# Patient Record
Sex: MALE | URBAN - METROPOLITAN AREA
[De-identification: names, ages, dates, MRNs, and addresses within clinical notes are randomized per-mention and may not be internally consistent; named-entity substitution may affect disease eponyms.]

---

## 2020-11-17 ENCOUNTER — OFFICE VISIT (OUTPATIENT)
Dept: URBAN - METROPOLITAN AREA CLINIC 128 | Facility: CLINIC | Age: 55
End: 2020-11-17
Payer: COMMERCIAL

## 2020-11-17 ENCOUNTER — LAB OUTSIDE AN ENCOUNTER (OUTPATIENT)
Dept: URBAN - METROPOLITAN AREA CLINIC 128 | Facility: CLINIC | Age: 55
End: 2020-11-17

## 2020-11-17 ENCOUNTER — WEB ENCOUNTER (OUTPATIENT)
Dept: URBAN - METROPOLITAN AREA CLINIC 128 | Facility: CLINIC | Age: 55
End: 2020-11-17

## 2020-11-17 DIAGNOSIS — K57.92 DIVERTICULITIS: ICD-10-CM

## 2020-11-17 DIAGNOSIS — R10.9 ABDOMINAL PAIN: ICD-10-CM

## 2020-11-17 DIAGNOSIS — K76.0 HEPATIC STEATOSIS: ICD-10-CM

## 2020-11-17 DIAGNOSIS — D72.829 LEUKOCYTOSIS: ICD-10-CM

## 2020-11-17 PROBLEM — 307496006 DIVERTICULITIS: Status: ACTIVE | Noted: 2020-11-17

## 2020-11-17 PROBLEM — 111359004 DIVERTICULITIS OF COLON: Status: ACTIVE | Noted: 2020-11-17

## 2020-11-17 PROBLEM — 111583006 LEUKOCYTOSIS: Status: ACTIVE | Noted: 2020-11-17

## 2020-11-17 PROBLEM — 197321007 STEATOSIS OF LIVER: Status: ACTIVE | Noted: 2020-11-17

## 2020-11-17 PROCEDURE — G8420 CALC BMI NORM PARAMETERS: HCPCS | Performed by: PHYSICIAN ASSISTANT

## 2020-11-17 PROCEDURE — 3017F COLORECTAL CA SCREEN DOC REV: CPT | Performed by: PHYSICIAN ASSISTANT

## 2020-11-17 PROCEDURE — G8427 DOCREV CUR MEDS BY ELIG CLIN: HCPCS | Performed by: PHYSICIAN ASSISTANT

## 2020-11-17 PROCEDURE — 4004F PT TOBACCO SCREEN RCVD TLK: CPT | Performed by: PHYSICIAN ASSISTANT

## 2020-11-17 PROCEDURE — G8483 FLU IMM NO ADMIN DOC REA: HCPCS | Performed by: PHYSICIAN ASSISTANT

## 2020-11-17 PROCEDURE — 99204 OFFICE O/P NEW MOD 45 MIN: CPT | Performed by: PHYSICIAN ASSISTANT

## 2020-11-17 RX ORDER — POLYETHYLENE GLYOCOL 3350, SODIUM CHLORIDE, SODIUM BICARBONATE AND POTASSIUM CHLORIDE 420; 11.2; 5.72; 1.48 G/4L; G/4L; G/4L; G/4L
SPLIT DOSE REGIMEN: TAKE ONE HALF OF PREP PO AT 5PM AND TAKE THE OTHER HALF OF PREP PO AT 10PM POWDER, FOR SOLUTION NASOGASTRIC; ORAL
Qty: 1 BOTTLE | Refills: 0 | OUTPATIENT
Start: 2020-11-17 | End: 2020-11-18

## 2020-11-17 RX ORDER — TAMSULOSIN HYDROCHLORIDE 0.4 MG/1
1 CAPSULE CAPSULE ORAL ONCE A DAY
Status: ACTIVE | COMMUNITY

## 2020-11-17 RX ORDER — OLMESARTAN MEDOXOMIL 40 MG/1
1 TABLET TABLET ORAL ONCE A DAY
Status: ACTIVE | COMMUNITY

## 2020-11-17 NOTE — HPI-OTHER HISTORIES
The patient is here for a hospital follow up. He went [place] [facility] [place] [facility] [place] for LLQ abdominal pain and nausea. His WBC was 14.4, BUN 29, LFTs normal. His abd/pelvic CT scan revealed extensive colonic diverticulosis with acute diverticulitis of the distal descending colon and proximal sigmoid colon with no focal abscess or perforation and hepatic steatosis is noted. He was discharged with augmentin and finished it. He now feels much better and is abdominal pain is gone now. For the past year he has been treated by a urologist for prostatitis and he is curious if instead of prostatitis maybe he was having flares of diverticulitis all along. This is the first time he has been diagnosed with it officially. His last colonoscopy was 2015 and states it was normal with no polyps noted. He takes alevel daily for joint paina nd was instructed to stop this.

## 2020-11-19 LAB
A/G RATIO: 2
ALBUMIN: 4.9
ALKALINE PHOSPHATASE: 57
ALT (SGPT): 36
AST (SGOT): 26
BASO (ABSOLUTE): 0.1
BASOS: 1
BILIRUBIN, TOTAL: 0.2
BUN/CREATININE RATIO: 20
BUN: 25
CALCIUM: 9.5
CARBON DIOXIDE, TOTAL: 25
CHLORIDE: 101
CREATININE: 1.27
EGFR IF AFRICN AM: 73
EGFR IF NONAFRICN AM: 63
EOS (ABSOLUTE): 0.3
EOS: 3
GLOBULIN, TOTAL: 2.5
GLUCOSE: 111
HEMATOCRIT: 43.8
HEMATOLOGY COMMENTS:: (no result)
HEMOGLOBIN: 14.7
IMMATURE CELLS: (no result)
IMMATURE GRANS (ABS): 0
IMMATURE GRANULOCYTES: 0
LYMPHS (ABSOLUTE): 2.2
LYMPHS: 26
MCH: 31.1
MCHC: 33.6
MCV: 93
MONOCYTES(ABSOLUTE): 1
MONOCYTES: 11
NEUTROPHILS (ABSOLUTE): 5
NEUTROPHILS: 59
NRBC: (no result)
PLATELETS: 246
POTASSIUM: 4.4
PROTEIN, TOTAL: 7.4
RBC: 4.72
RDW: 12.2
SODIUM: 141
WBC: 8.5

## 2020-12-18 ENCOUNTER — TELEPHONE ENCOUNTER (OUTPATIENT)
Dept: URBAN - METROPOLITAN AREA CLINIC 128 | Facility: CLINIC | Age: 55
End: 2020-12-18

## 2020-12-18 RX ORDER — CIPROFLOXACIN 500 MG/1
1 TABLET TABLET, FILM COATED ORAL
Qty: 20 TABLET | Refills: 0 | OUTPATIENT
Start: 2020-12-22

## 2020-12-18 RX ORDER — METRONIDAZOLE 500 MG/1
1 TABLET TABLET, FILM COATED ORAL THREE TIMES A DAY
Qty: 30 TABLET | Refills: 0 | OUTPATIENT
Start: 2020-12-22

## 2021-01-08 ENCOUNTER — OFFICE VISIT (OUTPATIENT)
Dept: URBAN - METROPOLITAN AREA SURGERY CENTER 31 | Facility: SURGERY CENTER | Age: 56
End: 2021-01-08

## 2021-01-15 ENCOUNTER — OFFICE VISIT (OUTPATIENT)
Dept: URBAN - METROPOLITAN AREA SURGERY CENTER 31 | Facility: SURGERY CENTER | Age: 56
End: 2021-01-15
Payer: COMMERCIAL

## 2021-01-15 ENCOUNTER — CLAIMS CREATED FROM THE CLAIM WINDOW (OUTPATIENT)
Dept: URBAN - METROPOLITAN AREA CLINIC 4 | Facility: CLINIC | Age: 56
End: 2021-01-15
Payer: COMMERCIAL

## 2021-01-15 DIAGNOSIS — D12.5 ADENOMA OF SIGMOID COLON: ICD-10-CM

## 2021-01-15 DIAGNOSIS — Z12.11 COLON CANCER SCREENING: ICD-10-CM

## 2021-01-15 DIAGNOSIS — D12.5 BENIGN NEOPLASM OF SIGMOID COLON: ICD-10-CM

## 2021-01-15 PROCEDURE — G8907 PT DOC NO EVENTS ON DISCHARG: HCPCS | Performed by: INTERNAL MEDICINE

## 2021-01-15 PROCEDURE — 88305 TISSUE EXAM BY PATHOLOGIST: CPT | Performed by: PATHOLOGY

## 2021-01-15 PROCEDURE — 45385 COLONOSCOPY W/LESION REMOVAL: CPT | Performed by: INTERNAL MEDICINE

## 2021-06-22 ENCOUNTER — TELEPHONE ENCOUNTER (OUTPATIENT)
Dept: URBAN - METROPOLITAN AREA CLINIC 128 | Facility: CLINIC | Age: 56
End: 2021-06-22

## 2021-06-24 ENCOUNTER — TELEPHONE ENCOUNTER (OUTPATIENT)
Dept: URBAN - METROPOLITAN AREA CLINIC 127 | Facility: CLINIC | Age: 56
End: 2021-06-24

## 2021-07-07 ENCOUNTER — OFFICE VISIT (OUTPATIENT)
Dept: URBAN - METROPOLITAN AREA CLINIC 128 | Facility: CLINIC | Age: 56
End: 2021-07-07
Payer: COMMERCIAL

## 2021-07-07 ENCOUNTER — WEB ENCOUNTER (OUTPATIENT)
Dept: URBAN - METROPOLITAN AREA CLINIC 128 | Facility: CLINIC | Age: 56
End: 2021-07-07

## 2021-07-07 VITALS
HEART RATE: 63 BPM | WEIGHT: 268 LBS | TEMPERATURE: 97.3 F | BODY MASS INDEX: 36.3 KG/M2 | DIASTOLIC BLOOD PRESSURE: 79 MMHG | HEIGHT: 72 IN | SYSTOLIC BLOOD PRESSURE: 145 MMHG

## 2021-07-07 DIAGNOSIS — R10.32 LEFT LOWER QUADRANT ABDOMINAL PAIN: ICD-10-CM

## 2021-07-07 DIAGNOSIS — R19.7 DIARRHEA, UNSPECIFIED TYPE: ICD-10-CM

## 2021-07-07 DIAGNOSIS — Z87.19 HISTORY OF DIVERTICULITIS: ICD-10-CM

## 2021-07-07 DIAGNOSIS — K21.9 GASTROESOPHAGEAL REFLUX DISEASE WITHOUT ESOPHAGITIS: ICD-10-CM

## 2021-07-07 DIAGNOSIS — R14.0 BLOATING: ICD-10-CM

## 2021-07-07 PROCEDURE — 99213 OFFICE O/P EST LOW 20 MIN: CPT | Performed by: INTERNAL MEDICINE

## 2021-07-07 RX ORDER — METRONIDAZOLE 500 MG/1
1 TABLET TABLET, FILM COATED ORAL THREE TIMES A DAY
Qty: 30 TABLET | Refills: 0 | Status: DISCONTINUED | COMMUNITY
Start: 2020-12-22

## 2021-07-07 RX ORDER — CHOLESTYRAMINE 4 G/9G
1 PACKET MIXED WITH WATER OR NON-CARBONATED DRINK POWDER, FOR SUSPENSION ORAL TWICE A DAY
Qty: 60 | Refills: 2 | OUTPATIENT
Start: 2021-07-07

## 2021-07-07 RX ORDER — OLMESARTAN MEDOXOMIL 40 MG/1
1 TABLET TABLET ORAL ONCE A DAY
Status: ACTIVE | COMMUNITY

## 2021-07-07 RX ORDER — PANTOPRAZOLE SODIUM 20 MG/1
1 TABLET TABLET, DELAYED RELEASE ORAL ONCE A DAY
Qty: 30 | Refills: 5 | OUTPATIENT
Start: 2021-07-07

## 2021-07-07 RX ORDER — CIPROFLOXACIN 500 MG/1
1 TABLET TABLET, FILM COATED ORAL
Qty: 20 TABLET | Refills: 0 | Status: DISCONTINUED | COMMUNITY
Start: 2020-12-22

## 2021-07-07 RX ORDER — TAMSULOSIN HYDROCHLORIDE 0.4 MG/1
1 CAPSULE CAPSULE ORAL ONCE A DAY
Status: DISCONTINUED | COMMUNITY

## 2021-07-07 NOTE — HPI-OTHER HISTORIES
The patient elicits having had LLQ abdominal pain and thought he was in a diverticulitis flare so he freeman dour office and Dr. Hoff called in two antibiotics for him to take. After finishing them he feels fine from his LLQ abdominal pain now. However,  he now has little energy, gas, early satiety, acid reflux, nausea, abdominal bloating, and some diarrhea. His reflux is refractory to tagamet.  He usually has 1-2 Bms a day and sometimes he will have up to 7 BMs a day. His 2015 colonoscopy was normal with no polyps noted. His 1/2021 colonoscopy revealed a tubular adenoma and he is to repeat it in 2 years.

## 2021-07-07 NOTE — PHYSICAL EXAM GASTROINTESTINAL
Abdomen , soft, nontender, nondistended , no guarding or rigidity , no masses palpable , normal bowel sounds, negatve Gutierrez's sign, negative psoas and obturators signs, negative CVA tenderness bilaterally Liver and Spleen , no hepatosplenomegaly Rectal deferred

## 2021-07-12 PROBLEM — 698065002 ACID REFLUX: Status: ACTIVE | Noted: 2021-07-12

## 2021-07-29 LAB
A/G RATIO: 2
ALBUMIN: 5.2
ALKALINE PHOSPHATASE: 60
ALT (SGPT): 56
AST (SGOT): 31
BASO (ABSOLUTE): 0.1
BASOS: 1
BILIRUBIN, TOTAL: 0.5
BUN/CREATININE RATIO: 19
BUN: 24
C DIFFICILE TOXINS A+B, EIA: NEGATIVE
CALCIUM: 10.1
CAMPYLOBACTER CULTURE: (no result)
CARBON DIOXIDE, TOTAL: 30
CHLORIDE: 101
CREATININE: 1.24
DEAMIDATED GLIADIN ABS, IGA: 3
DEAMIDATED GLIADIN ABS, IGG: 2
E COLI SHIGA TOXIN EIA: NEGATIVE
EGFR IF AFRICN AM: 75
EGFR IF NONAFRICN AM: 65
ENDOMYSIAL ANTIBODY IGA: NEGATIVE
EOS (ABSOLUTE): 0.1
EOS: 1
GLOBULIN, TOTAL: 2.6
GLUCOSE: 94
HEMATOCRIT: 49.6
HEMATOLOGY COMMENTS:: (no result)
HEMOGLOBIN: 16.9
IMMATURE CELLS: (no result)
IMMATURE GRANS (ABS): 0
IMMATURE GRANULOCYTES: 0
IMMUNOGLOBULIN A, QN, SERUM: 122
LYMPHS (ABSOLUTE): 2.1
LYMPHS: 28
Lab: (no result)
MCH: 31.9
MCHC: 34.1
MCV: 94
MONOCYTES(ABSOLUTE): 0.9
MONOCYTES: 13
NEUTROPHILS (ABSOLUTE): 4.2
NEUTROPHILS: 57
NRBC: (no result)
OVA + PARASITE EXAM: (no result)
PLATELETS: 231
POTASSIUM: 4.2
PROTEIN, TOTAL: 7.8
RBC: 5.29
RDW: 12.4
SALMONELLA/SHIGELLA SCREEN: (no result)
SODIUM: 142
T-TRANSGLUTAMINASE (TTG) IGA: <2
T-TRANSGLUTAMINASE (TTG) IGG: 4
TSH: 1.25
WBC: 7.4

## 2021-07-30 ENCOUNTER — OFFICE VISIT (OUTPATIENT)
Dept: URBAN - METROPOLITAN AREA SURGERY CENTER 31 | Facility: SURGERY CENTER | Age: 56
End: 2021-07-30
Payer: COMMERCIAL

## 2021-07-30 ENCOUNTER — CLAIMS CREATED FROM THE CLAIM WINDOW (OUTPATIENT)
Dept: URBAN - METROPOLITAN AREA CLINIC 4 | Facility: CLINIC | Age: 56
End: 2021-07-30
Payer: COMMERCIAL

## 2021-07-30 DIAGNOSIS — K21.9 ACID REFLUX: ICD-10-CM

## 2021-07-30 DIAGNOSIS — K31.89 GASTRIC FOVEOLAR HYPERPLASIA: ICD-10-CM

## 2021-07-30 DIAGNOSIS — R19.7 ACUTE DIARRHEA: ICD-10-CM

## 2021-07-30 DIAGNOSIS — K31.89 ACQUIRED DEFORMITY OF DUODENUM: ICD-10-CM

## 2021-07-30 DIAGNOSIS — R10.13 ABDOMINAL DISCOMFORT, EPIGASTRIC: ICD-10-CM

## 2021-07-30 PROCEDURE — 88312 SPECIAL STAINS GROUP 1: CPT | Performed by: PATHOLOGY

## 2021-07-30 PROCEDURE — 43239 EGD BIOPSY SINGLE/MULTIPLE: CPT | Performed by: INTERNAL MEDICINE

## 2021-07-30 PROCEDURE — 88305 TISSUE EXAM BY PATHOLOGIST: CPT | Performed by: PATHOLOGY

## 2022-04-04 ENCOUNTER — TELEPHONE ENCOUNTER (OUTPATIENT)
Dept: URBAN - METROPOLITAN AREA CLINIC 19 | Facility: CLINIC | Age: 57
End: 2022-04-04

## 2022-04-04 RX ORDER — HYOSCYAMINE SULFATE 0.12 MG/1
1 TABLET UNDER THE TONGUE AND ALLOW TO DISSOLVE  AS NEEDED TABLET, ORALLY DISINTEGRATING ORAL THREE TIMES A DAY
Qty: 30 | Refills: 0 | OUTPATIENT
Start: 2022-04-05 | End: 2022-05-05

## 2022-04-05 ENCOUNTER — WEB ENCOUNTER (OUTPATIENT)
Dept: URBAN - METROPOLITAN AREA CLINIC 128 | Facility: CLINIC | Age: 57
End: 2022-04-05

## 2022-04-05 RX ORDER — PANTOPRAZOLE SODIUM 20 MG/1
1 TABLET TABLET, DELAYED RELEASE ORAL ONCE A DAY
Qty: 30 | Refills: 5
Start: 2021-07-07

## 2022-04-06 ENCOUNTER — OFFICE VISIT (OUTPATIENT)
Dept: URBAN - METROPOLITAN AREA CLINIC 128 | Facility: CLINIC | Age: 57
End: 2022-04-06
Payer: COMMERCIAL

## 2022-04-06 DIAGNOSIS — R14.0 BLOATING: ICD-10-CM

## 2022-04-06 DIAGNOSIS — R10.9 ABDOMINAL PAIN, UNSPECIFIED ABDOMINAL LOCATION: ICD-10-CM

## 2022-04-06 DIAGNOSIS — R19.7 DIARRHEA, UNSPECIFIED TYPE: ICD-10-CM

## 2022-04-06 DIAGNOSIS — K21.9 GASTROESOPHAGEAL REFLUX DISEASE WITHOUT ESOPHAGITIS: ICD-10-CM

## 2022-04-06 DIAGNOSIS — K42.9 UMBILICAL HERNIA WITHOUT OBSTRUCTION AND WITHOUT GANGRENE: ICD-10-CM

## 2022-04-06 DIAGNOSIS — Z86.010 PERSONAL HISTORY OF COLONIC POLYPS: ICD-10-CM

## 2022-04-06 DIAGNOSIS — Z87.19 HISTORY OF DIVERTICULITIS: ICD-10-CM

## 2022-04-06 PROCEDURE — 99214 OFFICE O/P EST MOD 30 MIN: CPT | Performed by: PHYSICIAN ASSISTANT

## 2022-04-06 RX ORDER — HYOSCYAMINE SULFATE 0.12 MG/1
1 TABLET UNDER THE TONGUE AND ALLOW TO DISSOLVE  AS NEEDED TABLET, ORALLY DISINTEGRATING ORAL THREE TIMES A DAY
Qty: 30 | Refills: 0 | Status: ACTIVE | COMMUNITY
Start: 2022-04-05 | End: 2022-05-05

## 2022-04-06 RX ORDER — CHOLESTYRAMINE 4 G/9G
1 PACKET MIXED WITH WATER OR NON-CARBONATED DRINK POWDER, FOR SUSPENSION ORAL TWICE A DAY
Qty: 60 | Refills: 2 | Status: ON HOLD | COMMUNITY
Start: 2021-07-07

## 2022-04-06 RX ORDER — METRONIDAZOLE 375 MG/1
2 CAPSULES CAPSULE ORAL
Qty: 30 | OUTPATIENT
Start: 2022-04-06 | End: 2022-04-11

## 2022-04-06 RX ORDER — CIPROFLOXACIN 500 MG/1
1 TABLET TABLET, FILM COATED ORAL
Qty: 20 TABLET | Refills: 0 | OUTPATIENT
Start: 2022-04-06 | End: 2022-04-16

## 2022-04-06 RX ORDER — OLMESARTAN MEDOXOMIL 40 MG/1
1 TABLET TABLET ORAL ONCE A DAY
Status: ACTIVE | COMMUNITY

## 2022-04-06 RX ORDER — PANTOPRAZOLE SODIUM 20 MG/1
1 TABLET TABLET, DELAYED RELEASE ORAL ONCE A DAY
Qty: 30 | Refills: 5 | OUTPATIENT

## 2022-04-06 RX ORDER — CHOLESTYRAMINE 4 G/9G
1 PACKET MIXED WITH WATER OR NON-CARBONATED DRINK POWDER, FOR SUSPENSION ORAL TWICE A DAY
Qty: 60 | Refills: 2 | OUTPATIENT
Start: 2022-04-06

## 2022-04-06 RX ORDER — RIFAXIMIN 550 MG/1
1 TABLET TABLET ORAL THREE TIMES A DAY
Qty: 42 TABLET | Refills: 0 | OUTPATIENT
Start: 2022-04-06 | End: 2022-04-20

## 2022-04-06 RX ORDER — PANTOPRAZOLE SODIUM 20 MG/1
1 TABLET TABLET, DELAYED RELEASE ORAL ONCE A DAY
Qty: 30 | Refills: 5 | Status: ACTIVE | COMMUNITY
Start: 2021-07-07

## 2022-04-06 NOTE — PHYSICAL EXAM GASTROINTESTINAL
Abdomen , soft, tenderness to palpation was noted in the LUQ and LLQ, nondistended , no guarding or rigidity , no masses palpable , normal bowel sounds, a diastasis recti was noted, an uncarcerated umbilical hernia was noted, negative mUrpny's sign. negative psoa and obturators signs, negative CVA tenderness bilaterally Liver and Spleen , no hepatosplenomegaly Rectal deferred

## 2022-04-06 NOTE — HPI-OTHER HISTORIES
The patient is here today for abdominal pain. He had COVID-19 in 01/2022. He has known diverticulitis and has been taking fiber to avoid flares. Currnetlhe elicits having more bloating issues. He has noticed at his umbilicus some bulging. He elicits generalized abdominal cramps. His 1/2021 colonoscopy revealed a tubular adenoma and he is to repeat it in 2 years. The patient had a negative urinalysis last week. He state shis prostate was normal 1 year ago. EGD 2021 revealed gastropathy.

## 2022-04-07 ENCOUNTER — WEB ENCOUNTER (OUTPATIENT)
Dept: URBAN - METROPOLITAN AREA CLINIC 128 | Facility: CLINIC | Age: 57
End: 2022-04-07

## 2022-04-08 ENCOUNTER — LAB OUTSIDE AN ENCOUNTER (OUTPATIENT)
Dept: URBAN - METROPOLITAN AREA CLINIC 128 | Facility: CLINIC | Age: 57
End: 2022-04-08

## 2022-04-11 ENCOUNTER — WEB ENCOUNTER (OUTPATIENT)
Dept: URBAN - METROPOLITAN AREA CLINIC 128 | Facility: CLINIC | Age: 57
End: 2022-04-11

## 2022-04-13 LAB
A/G RATIO: 2.2
ALBUMIN: 4.7
ALKALINE PHOSPHATASE: 57
ALT (SGPT): 37
AST (SGOT): 27
BASO (ABSOLUTE): 0.1
BASOS: 2
BILIRUBIN, TOTAL: 0.3
BUN/CREATININE RATIO: 15
BUN: 19
C DIFFICILE TOXINS A+B, EIA: NEGATIVE
CALCIUM: 9.4
CAMPYLOBACTER CULTURE: (no result)
CARBON DIOXIDE, TOTAL: 24
CHLORIDE: 99
CREATININE: 1.23
DEAMIDATED GLIADIN ABS, IGA: 2
DEAMIDATED GLIADIN ABS, IGG: 3
E COLI SHIGA TOXIN EIA: NEGATIVE
EGFR: 69
ENDOMYSIAL ANTIBODY IGA: NEGATIVE
EOS (ABSOLUTE): 0.2
EOS: 4
FATS, NEUTRAL: NORMAL
FATS, TOTAL: NORMAL
GIARDIA LAMBLIA AG, EIA: NEGATIVE
GLOBULIN, TOTAL: 2.1
GLUCOSE: 91
HEMATOCRIT: 44.5
HEMATOLOGY COMMENTS:: (no result)
HEMOGLOBIN: 14.8
IMMATURE CELLS: (no result)
IMMATURE GRANS (ABS): 0
IMMATURE GRANULOCYTES: 1
IMMUNOGLOBULIN A, QN, SERUM: 108
LIPASE: 40
LYMPHS (ABSOLUTE): 2
LYMPHS: 32
Lab: (no result)
MCH: 30.5
MCHC: 33.3
MCV: 92
MONOCYTES(ABSOLUTE): 0.7
MONOCYTES: 12
NEUTROPHILS (ABSOLUTE): 3.1
NEUTROPHILS: 49
NRBC: (no result)
OVA + PARASITE EXAM: (no result)
PANCREATIC ELASTASE, FECAL: 500
PLATELETS: 196
POTASSIUM: 4.8
PROTEIN, TOTAL: 6.8
RBC: 4.85
RDW: 12.4
SALMONELLA/SHIGELLA SCREEN: (no result)
SODIUM: 138
T-TRANSGLUTAMINASE (TTG) IGA: <2
T-TRANSGLUTAMINASE (TTG) IGG: 3
TSH: 4.43
WBC: 6.1
WHITE BLOOD CELLS (WBC), STOOL: (no result)

## 2022-04-21 ENCOUNTER — OFFICE VISIT (OUTPATIENT)
Dept: URBAN - METROPOLITAN AREA CLINIC 19 | Facility: CLINIC | Age: 57
End: 2022-04-21

## 2022-05-10 PROBLEM — 266435005: Status: ACTIVE | Noted: 2021-07-07

## 2022-05-10 PROBLEM — 428283002: Status: ACTIVE | Noted: 2022-04-06

## 2022-05-11 ENCOUNTER — OFFICE VISIT (OUTPATIENT)
Dept: URBAN - METROPOLITAN AREA CLINIC 128 | Facility: CLINIC | Age: 57
End: 2022-05-11
Payer: COMMERCIAL

## 2022-05-11 ENCOUNTER — DASHBOARD ENCOUNTERS (OUTPATIENT)
Age: 57
End: 2022-05-11

## 2022-05-11 DIAGNOSIS — Z86.010 PERSONAL HISTORY OF COLONIC POLYPS: ICD-10-CM

## 2022-05-11 DIAGNOSIS — K21.9 GASTROESOPHAGEAL REFLUX DISEASE WITHOUT ESOPHAGITIS: ICD-10-CM

## 2022-05-11 DIAGNOSIS — R10.9 ABDOMINAL PAIN, UNSPECIFIED ABDOMINAL LOCATION: ICD-10-CM

## 2022-05-11 DIAGNOSIS — R14.0 BLOATING: ICD-10-CM

## 2022-05-11 DIAGNOSIS — R19.7 DIARRHEA, UNSPECIFIED TYPE: ICD-10-CM

## 2022-05-11 DIAGNOSIS — Z87.19 HISTORY OF DIVERTICULITIS: ICD-10-CM

## 2022-05-11 DIAGNOSIS — K42.9 UMBILICAL HERNIA WITHOUT OBSTRUCTION AND WITHOUT GANGRENE: ICD-10-CM

## 2022-05-11 PROCEDURE — 99213 OFFICE O/P EST LOW 20 MIN: CPT | Performed by: PHYSICIAN ASSISTANT

## 2022-05-11 RX ORDER — PANTOPRAZOLE SODIUM 20 MG/1
1 TABLET TABLET, DELAYED RELEASE ORAL ONCE A DAY
Qty: 30 | Refills: 5 | OUTPATIENT

## 2022-05-11 RX ORDER — CHOLESTYRAMINE 4 G/9G
1 PACKET MIXED WITH WATER OR NON-CARBONATED DRINK POWDER, FOR SUSPENSION ORAL TWICE A DAY
Qty: 60 | Refills: 2 | Status: ON HOLD | COMMUNITY
Start: 2021-07-07

## 2022-05-11 RX ORDER — CHOLESTYRAMINE 4 G/9G
1 PACKET MIXED WITH WATER OR NON-CARBONATED DRINK POWDER, FOR SUSPENSION ORAL TWICE A DAY
Qty: 60 | Refills: 2 | OUTPATIENT

## 2022-05-11 RX ORDER — OLMESARTAN MEDOXOMIL 40 MG/1
1 TABLET TABLET ORAL ONCE A DAY
Status: ACTIVE | COMMUNITY

## 2022-05-11 RX ORDER — CHOLESTYRAMINE 4 G/9G
1 PACKET MIXED WITH WATER OR NON-CARBONATED DRINK POWDER, FOR SUSPENSION ORAL TWICE A DAY
Qty: 60 | Refills: 2 | Status: ACTIVE | COMMUNITY
Start: 2022-04-06

## 2022-05-11 RX ORDER — HYOSCYAMINE SULFATE 0.12 MG/1
1 TABLET UNDER THE TONGUE AND ALLOW TO DISSOLVE  AS NEEDED TABLET, ORALLY DISINTEGRATING ORAL THREE TIMES A DAY
Qty: 30 | Refills: 0 | OUTPATIENT
Start: 2022-05-11 | End: 2022-06-10

## 2022-05-11 RX ORDER — PANTOPRAZOLE SODIUM 20 MG/1
1 TABLET TABLET, DELAYED RELEASE ORAL ONCE A DAY
Qty: 30 | Refills: 5 | Status: ACTIVE | COMMUNITY

## 2022-05-11 NOTE — PHYSICAL EXAM GASTROINTESTINAL
Abdomen , soft, non-tender, nondistended , no guarding or rigidity , no masses palpable , normal bowel sounds, a diastasis recti was noted, an umbilical hernia was noted, negative Gutierrez's sign. negative psoas and obturators signs, negative CVA tenderness bilaterally Liver and Spleen , no hepatosplenomegaly Rectal deferred

## 2022-05-11 NOTE — HPI-OTHER HISTORIES
The patient is here today for abdominal pain. He is feeling much better from his diverticulitis now. He finished his antibiotics.  He had COVID-19 in 01/2022. He has known diverticulitis and has been taking fiber to avoid flares. He has noticed at his umbilicus some bulging and his surgery will be scheduled for 06/2022 with Dr. Owusu. His 1/2021 colonoscopy revealed a tubular adenoma and he is to repeat it in 2 years. The patient had a negative urinalysis last week. He state shis prostate was normal 1 year ago. EGD 2021 revealed gastropathy. He needs levsin refilled.

## 2022-07-19 ENCOUNTER — TELEPHONE ENCOUNTER (OUTPATIENT)
Dept: URBAN - METROPOLITAN AREA CLINIC 128 | Facility: CLINIC | Age: 57
End: 2022-07-19

## 2023-04-17 ENCOUNTER — ERX REFILL RESPONSE (OUTPATIENT)
Dept: URBAN - METROPOLITAN AREA CLINIC 128 | Facility: CLINIC | Age: 58
End: 2023-04-17

## 2023-04-17 RX ORDER — PANTOPRAZOLE SODIUM 20 MG/1
TAKE ONE TABLET BY MOUTH DAILY TABLET, DELAYED RELEASE ORAL
Qty: 30 TABLET | Refills: 5 | OUTPATIENT

## 2023-04-17 RX ORDER — PANTOPRAZOLE SODIUM 20 MG/1
1 TABLET TABLET, DELAYED RELEASE ORAL ONCE A DAY
Qty: 30 | Refills: 5 | OUTPATIENT

## 2023-12-26 ENCOUNTER — CLAIMS CREATED FROM THE CLAIM WINDOW (OUTPATIENT)
Dept: URBAN - METROPOLITAN AREA CLINIC 4 | Facility: CLINIC | Age: 58
End: 2023-12-26
Payer: COMMERCIAL

## 2023-12-26 ENCOUNTER — CLAIMS CREATED FROM THE CLAIM WINDOW (OUTPATIENT)
Dept: URBAN - METROPOLITAN AREA SURGERY CENTER 31 | Facility: SURGERY CENTER | Age: 58
End: 2023-12-26
Payer: COMMERCIAL

## 2023-12-26 ENCOUNTER — CLAIMS CREATED FROM THE CLAIM WINDOW (OUTPATIENT)
Dept: URBAN - METROPOLITAN AREA SURGERY CENTER 31 | Facility: SURGERY CENTER | Age: 58
End: 2023-12-26

## 2023-12-26 DIAGNOSIS — K57.30 DIVERTICULA, COLON: ICD-10-CM

## 2023-12-26 DIAGNOSIS — Z86.010 ADENOMAS PERSONAL HISTORY OF COLONIC POLYPS: ICD-10-CM

## 2023-12-26 DIAGNOSIS — K57.90 DIVERTICULOSIS: ICD-10-CM

## 2023-12-26 DIAGNOSIS — D12.5 ADENOMA OF SIGMOID COLON: ICD-10-CM

## 2023-12-26 DIAGNOSIS — K63.5 BENIGN COLON POLYPS: ICD-10-CM

## 2023-12-26 DIAGNOSIS — D12.3 ADENOMA OF TRANSVERSE COLON: ICD-10-CM

## 2023-12-26 DIAGNOSIS — D12.3 BENIGN NEOPLASM OF TRANSVERSE COLON: ICD-10-CM

## 2023-12-26 DIAGNOSIS — D12.7 ADENOMA DETERMINED BY COLORECTAL BIOPSY: ICD-10-CM

## 2023-12-26 PROCEDURE — 00811 ANES LWR INTST NDSC NOS: CPT | Performed by: NURSE ANESTHETIST, CERTIFIED REGISTERED

## 2023-12-26 PROCEDURE — 45385 COLONOSCOPY W/LESION REMOVAL: CPT | Performed by: INTERNAL MEDICINE

## 2023-12-26 PROCEDURE — G8907 PT DOC NO EVENTS ON DISCHARG: HCPCS | Performed by: INTERNAL MEDICINE

## 2023-12-26 PROCEDURE — 88305 TISSUE EXAM BY PATHOLOGIST: CPT | Performed by: PATHOLOGY
